# Patient Record
Sex: MALE | Race: ASIAN | NOT HISPANIC OR LATINO | URBAN - METROPOLITAN AREA
[De-identification: names, ages, dates, MRNs, and addresses within clinical notes are randomized per-mention and may not be internally consistent; named-entity substitution may affect disease eponyms.]

---

## 2023-02-22 ENCOUNTER — EMERGENCY (EMERGENCY)
Facility: HOSPITAL | Age: 53
LOS: 1 days | Discharge: HOME | End: 2023-02-22
Admitting: EMERGENCY MEDICINE
Payer: COMMERCIAL

## 2023-02-22 VITALS
SYSTOLIC BLOOD PRESSURE: 170 MMHG | OXYGEN SATURATION: 97 % | RESPIRATION RATE: 16 BRPM | HEART RATE: 76 BPM | TEMPERATURE: 98 F | DIASTOLIC BLOOD PRESSURE: 108 MMHG

## 2023-02-22 VITALS
TEMPERATURE: 99 F | OXYGEN SATURATION: 96 % | SYSTOLIC BLOOD PRESSURE: 110 MMHG | DIASTOLIC BLOOD PRESSURE: 71 MMHG | HEART RATE: 90 BPM | RESPIRATION RATE: 16 BRPM

## 2023-02-22 DIAGNOSIS — I10 ESSENTIAL (PRIMARY) HYPERTENSION: ICD-10-CM
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
## 2023-02-22 DIAGNOSIS — R22.0 LOCALIZED SWELLING, MASS AND LUMP, HEAD: ICD-10-CM

## 2023-02-22 DIAGNOSIS — T78.3XXA ANGIONEUROTIC EDEMA, INITIAL ENCOUNTER: ICD-10-CM
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
## 2023-02-22 PROCEDURE — 99284 EMERGENCY DEPT VISIT MOD MDM: CPT

## 2023-02-22 RX ORDER — FAMOTIDINE 10 MG/ML
20 INJECTION INTRAVENOUS ONCE
Refills: 0 | Status: COMPLETED | OUTPATIENT
Start: 2023-02-22 | End: 2023-02-22

## 2023-02-22 RX ORDER — TRANEXAMIC ACID 100 MG/ML
1000 INJECTION, SOLUTION INTRAVENOUS ONCE
Refills: 0 | Status: COMPLETED | OUTPATIENT
Start: 2023-02-22 | End: 2023-02-22

## 2023-02-22 RX ORDER — DEXAMETHASONE 0.5 MG/5ML
10 ELIXIR ORAL ONCE
Refills: 0 | Status: COMPLETED | OUTPATIENT
Start: 2023-02-22 | End: 2023-02-22

## 2023-02-22 RX ORDER — EPINEPHRINE 0.3 MG/.3ML
0.3 INJECTION INTRAMUSCULAR; SUBCUTANEOUS ONCE
Refills: 0 | Status: COMPLETED | OUTPATIENT
Start: 2023-02-22 | End: 2023-02-22

## 2023-02-22 RX ADMIN — FAMOTIDINE 100 MILLIGRAM(S): 10 INJECTION INTRAVENOUS at 01:54

## 2023-02-22 RX ADMIN — EPINEPHRINE 0.3 MILLIGRAM(S): 0.3 INJECTION INTRAMUSCULAR; SUBCUTANEOUS at 05:44

## 2023-02-22 RX ADMIN — TRANEXAMIC ACID 220 MILLIGRAM(S): 100 INJECTION, SOLUTION INTRAVENOUS at 01:35

## 2023-02-22 RX ADMIN — Medication 102 MILLIGRAM(S): at 01:33

## 2023-02-22 RX ADMIN — EPINEPHRINE 0.3 MILLIGRAM(S): 0.3 INJECTION INTRAMUSCULAR; SUBCUTANEOUS at 01:34

## 2023-02-22 NOTE — ED PROVIDER NOTE - CLINICAL SUMMARY MEDICAL DECISION MAKING FREE TEXT BOX
well appearing pt pw swelling of the L lower rip w/mild swelling of the tongue tolerating oral secretions, similar episodes in the past attributed to anti-htn medications, no wheezing or sob, angioedema vs allergic reaction, took benadryl pta, plan: decadron, epiPen, pepcid

## 2023-02-22 NOTE — ED ADULT NURSE REASSESSMENT NOTE - NS ED NURSE REASSESS COMMENT FT1
patient endorses resolution of symptoms at this time. Reports feeling better and to have returned to baseline. Denies any pain or discomfort, sob, chills, n/v/d, itchiness, swelling at this time.

## 2023-02-22 NOTE — ED PROVIDER NOTE - OBJECTIVE STATEMENT
53 yo m pmhx sig for htn pw acute onset of L lower lip and tongue swelling w/o any sob similar episodes in the past with negative evaluation by an allergist, no sob, no wheezing, no abd pain, no n/v. Pt had 2 similar episodes in the past that were attributed to anto-HTN medication but switched out by PMD, does not recall anti-HTN medication.     I have reviewed available current nursing and previous documentation of past medical, surgical, family, and/or social history.

## 2023-02-22 NOTE — ED PROVIDER NOTE - NSFOLLOWUPINSTRUCTIONS_ED_ALL_ED_FT
Angioedema    WHAT YOU NEED TO KNOW:    Angioedema is sudden swelling caused by fluid that collects in deep layers of the skin. Swelling occurs most often on the face, lips, tongue, or throat, but it can happen anywhere on the body. Your risk for angioedema increases if you have food or insect allergies or a family history of angioedema. Emotional stress, autoimmune disorders, and medicines, such as ACE inhibitors, also increase your risk. Your symptoms may be mild, or you may develop anaphylaxis. Anaphylaxis is a sudden, life-threatening reaction that needs immediate treatment.     DISCHARGE INSTRUCTIONS:    Call 911 for signs or symptoms of anaphylaxis, such as trouble breathing, swelling in your mouth or throat, or wheezing. You may also have itching, a rash, hives, or feel like you are going to faint.    Return to the emergency department if:   •You have sudden behavior changes or irritability.   •You are dizzy and your heart is beating faster than usual.     Contact your healthcare provider if:   •Your swelling does not improve, even after you take your medicines.   •You have questions or concerns about your condition or care.     Medicines:   •Antihistamines decrease mild symptoms such as itching or a rash.   •Epinephrine is used to treat severe allergic reactions such as anaphylaxis.   •Steroids: This medicine may be given to decrease redness, pain, and swelling.    •Take your medicine as directed. Contact your healthcare provider if you think your medicine is not helping or if you have side effects. Tell him of her if you are allergic to any medicine. Keep a list of the medicines, vitamins, and herbs you take. Include the amounts, and when and why you take them. Bring the list or the pill bottles to follow-up visits. Carry your medicine list with you in case of an emergency.    Steps to take for signs or symptoms of anaphylaxis:   •Immediately give 1 shot of epinephrine only into the outer thigh muscle.   •Leave the shot in place as directed. Your healthcare provider may recommend you leave it in place for up to 10 seconds before you remove it. This helps make sure all of the epinephrine is delivered.   •Call 911 and go to the emergency department, even if the shot improved symptoms. Do not drive yourself. Bring the used epinephrine shot with you.     Safety precautions to take if you are at risk for anaphylaxis:   •Keep 2 shots of epinephrine with you at all times. You may need a second shot, because epinephrine only works for about 20 minutes and symptoms may return. Your healthcare provider can show you and family members how to give the shot. Check the expiration date every month and replace it before it expires.  •Create an action plan. Your healthcare provider can help you create a written plan that explains the allergy and an emergency plan to treat a reaction. The plan explains when to give a second epinephrine shot if symptoms return or do not improve after the first. Give copies of the action plan and emergency instructions to family members, work and school staff, and  providers. Show them how to give a shot of epinephrine.  •Be careful when you exercise. If you have had exercise-induced anaphylaxis, do not exercise right after you eat. Stop exercising right away if you start to develop any signs or symptoms of anaphylaxis. You may first feel tired, warm, or have itchy skin. Hives, swelling, and severe breathing problems may develop if you continue to exercise.  •Carry medical alert identification. Wear medical alert jewelry or carry a card that explains the allergy. Ask your healthcare provider where to get these items.   •Keep a symptom diary. Include information about how often your symptoms occur, how long they last, and if they are mild or severe. Also keep information on what you ate, what happened, or which medicines you took before the swelling started.   •Avoid triggers. Triggers include foods, medicines, and other things that you know cause symptoms. You may need to see a specialist, such as an allergist or dietitian, to learn what to avoid.    Follow up with your healthcare provider as directed: Write down your questions so you remember to ask them during your visits.     Allergic Reaction    An allergic reaction is an abnormal reaction to a substance (allergen) by the body's defense system. Common allergens include medicines, food, insect bites or stings, and blood products. The body releases certain proteins into the blood that can cause a variety of symptoms such as an itchy rash, wheezing, swelling of the face/lips/tongue/throat, abdominal pain, nausea or vomiting. An allergic reaction is usually treated with medication. If your health care provider prescribed you an epinephrine injection device, make sure to keep it with you at all times.    SEEK IMMEDIATE MEDICAL CARE IF YOU HAVE THE FOLLOWING SYMPTOMS: allergic reaction severe enough that required you to use epinephrine, tightness in your chest, swelling around your lips/tongue/throat, abdominal pain, vomiting or diarrhea, or lightheadedness/dizziness. These symptoms may represent a serious problem that is an emergency. Do not wait to see if the symptoms will go away. Use your auto-injector pen or anaphylaxis kit as you have been instructed, and get medical help right away. Call your local emergency services (911 in the [place] Do not drive yourself to the hospital.

## 2023-02-22 NOTE — ED PROVIDER NOTE - PHYSICAL EXAMINATION
Physical Exam    Vital Signs: I have reviewed the initial vital signs.  Constitutional: well-nourished, appears stated age  Eyes: PERRLA, and symmetrical lids.  ENT: Neck supple with no adenopathy, moist MM, +swelling L lower lip and mild tongues swelling, tolerating oral secretions w/o pooling of secretions, uvula midline w/o swelling  Cardiovascular: regular rate, regular rhythm, well-perfused extremities  Respiratory: unlabored respiratory effort, clear to auscultation bilaterally  Gastrointestinal: soft, non-tender abdomen, no pulsatile mass  Musculoskeletal: supple neck, no lower extremity edema  Integumentary: warm, dry, no rash  Neurologic: extremities’ motor and sensory functions grossly intact  Psychiatric: A&Ox3, appropriate mood, appropriate affect

## 2023-02-22 NOTE — ED ADULT NURSE NOTE - CHIEF COMPLAINT
Spine appears normal, range of motion is not limited, no muscle or joint tenderness
The patient is a 52y Male complaining of allergic reaction.

## 2023-02-22 NOTE — ED ADULT NURSE NOTE - CHIEF COMPLAINT QUOTE
Pt walk in c/o allergic reaction, swelling to left side of facial lips and left facial cheek x6 hours. Pt endorses hx of allergic reactions, states he's had multiple allergy test but hasn't be able to identify allergies. Pt denies hx of intubations or use of epi pens. Pt denies difficulty swallowing, or speaking. Airway patent. No drooling noted. Pt states he took 4 Benadryl tablets prior to ED arrival.

## 2023-02-22 NOTE — ED PROVIDER NOTE - PATIENT PORTAL LINK FT
You can access the FollowMyHealth Patient Portal offered by [facility] by registering at the following website: [email or web address]. By joining [employer]’s FollowMyHealth portal, you will also be able to view your health information using other applications (apps) compatible with our system.

## 2024-08-07 NOTE — ED ADULT TRIAGE NOTE - CHIEF COMPLAINT QUOTE
Patient was on 0.5 mg BID, she would like to go back up to 1 mg BID, please sign script if you agree.    Pt walk in c/o allergic reaction, swelling to left side of facial lips and left facial cheek x6 hours. Pt endorses hx of allergic reactions, states he's had multiple allergy test but hasn't be able to identify allergies. Pt denies hx of intubations or use of epi pens. Pt denies difficulty swallowing, or speaking. Airway patent. No drooling noted. Pt states he took 4 Benadryl tablets prior to ED arrival.